# Patient Record
Sex: MALE | Race: WHITE | NOT HISPANIC OR LATINO | Employment: FULL TIME | URBAN - METROPOLITAN AREA
[De-identification: names, ages, dates, MRNs, and addresses within clinical notes are randomized per-mention and may not be internally consistent; named-entity substitution may affect disease eponyms.]

---

## 2022-02-18 ENCOUNTER — OFFICE VISIT (OUTPATIENT)
Dept: FAMILY MEDICINE CLINIC | Facility: CLINIC | Age: 22
End: 2022-02-18

## 2022-02-18 VITALS
RESPIRATION RATE: 16 BRPM | TEMPERATURE: 98 F | BODY MASS INDEX: 27.62 KG/M2 | HEART RATE: 67 BPM | SYSTOLIC BLOOD PRESSURE: 126 MMHG | DIASTOLIC BLOOD PRESSURE: 67 MMHG | OXYGEN SATURATION: 98 % | WEIGHT: 176 LBS | HEIGHT: 67 IN

## 2022-02-18 DIAGNOSIS — R53.83 OTHER FATIGUE: Primary | ICD-10-CM

## 2022-02-18 DIAGNOSIS — R63.5 WEIGHT GAIN: ICD-10-CM

## 2022-02-18 DIAGNOSIS — Z86.39 HISTORY OF HYPOTHYROIDISM: ICD-10-CM

## 2022-02-18 DIAGNOSIS — K59.00 CONSTIPATION, UNSPECIFIED CONSTIPATION TYPE: ICD-10-CM

## 2022-02-18 PROCEDURE — 99204 OFFICE O/P NEW MOD 45 MIN: CPT

## 2022-02-18 NOTE — PROGRESS NOTES
"Chief Complaint  Thyroid Problem    Subjective          Rafael Mcdowell presents to [facility]    History of Present Illness    Rafael Mcdowell 21 y.o. male who presents today for a new patient appointment.  He has a history of hypothyroidism. He is not currently taking thyroid medication.  He is here to establish care, is here for a wellness check up and is a new patient to me I reviewed the PFSH recorded today by my MA/LPN staff. He has been feeling tired.    He has gained 8 pounds over the last 2-3 months due to increased appetite. He is also experiencing fatigue, hair loss, decreased concentration, constipation, and forgetfulness. He was treated for hypothyroidism in 2019 for two weeks. He was told that his thyroid levels were back to normal after two weeks and to stop taking Levothyroxine. He has a family history of hypothyroidism with his Mother.     He  denies medication side effects.        Objective     Vital Signs:   /67   Pulse 67   Temp 98 °F (36.7 °C)   Resp 16   Ht 170.2 cm (67\")   Wt 79.8 kg (176 lb)   SpO2 98%   BMI 27.57 kg/m²      Review of Systems   Constitutional: Positive for appetite change (increased appetite), fatigue and unexpected weight gain (due to increased appetite). Negative for activity change.   Respiratory: Negative for chest tightness and shortness of breath.    Cardiovascular: Negative for chest pain.   Gastrointestinal: Positive for constipation. Negative for anal bleeding, blood in stool, nausea and vomiting.   Neurological: Negative for dizziness, light-headedness and confusion.   Psychiatric/Behavioral: Positive for decreased concentration. Negative for agitation, behavioral problems, self-injury, sleep disturbance, suicidal ideas, negative for hyperactivity and depressed mood. The patient is not nervous/anxious.          Physical Exam  Vitals and nursing note reviewed.   Constitutional:       General: He is not in acute distress.    "  Appearance: Normal appearance. He is well-developed. He is not ill-appearing.   HENT:      Head: Normocephalic and atraumatic.   Eyes:      General: No scleral icterus.        Right eye: No discharge.         Left eye: No discharge.      Conjunctiva/sclera: Conjunctivae normal.      Pupils: Pupils are equal, round, and reactive to light.   Neck:      Thyroid: No thyromegaly.      Vascular: No carotid bruit.      Trachea: No tracheal deviation.   Cardiovascular:      Rate and Rhythm: Normal rate and regular rhythm.      Pulses: Normal pulses.      Heart sounds: Normal heart sounds. No murmur heard.  No gallop.    Pulmonary:      Effort: Pulmonary effort is normal. No tachypnea, bradypnea or respiratory distress.      Breath sounds: No decreased air movement. No decreased breath sounds, wheezing or rales.   Musculoskeletal:         General: Normal range of motion.      Cervical back: Normal range of motion and neck supple.      Right lower leg: No edema.      Left lower leg: No edema.   Skin:     General: Skin is warm.      Coloration: Skin is not pale.      Findings: No erythema or rash.   Neurological:      Mental Status: He is alert and oriented to person, place, and time.      Motor: No weakness or abnormal muscle tone.      Coordination: Coordination normal.      Gait: Gait normal.   Psychiatric:         Mood and Affect: Mood normal.         Behavior: Behavior normal.         Thought Content: Thought content normal.         Judgment: Judgment normal.                     Assessment and Plan      Diagnoses and all orders for this visit:    1. Other fatigue (Primary)  Comments:  Labs today    Orders:  -     Comprehensive metabolic panel  -     Lipid panel  -     CBC and Differential  -     TSH  -     T4, Free  -     T3, Free  -     Testosterone, Free, Total  -     Vitamin D 25 Hydroxy  -     Vitamin B12    2. Weight gain  Comments:  Labs today  Orders:  -     Comprehensive metabolic panel  -     Lipid panel  -      CBC and Differential  -     TSH  -     T4, Free  -     T3, Free  -     Testosterone, Free, Total  -     Vitamin D 25 Hydroxy  -     Vitamin B12    3. Constipation, unspecified constipation type  Comments:  Transient  Increase water intake   Take Miralax OTC as needed  Orders:  -     Comprehensive metabolic panel  -     Lipid panel  -     CBC and Differential  -     TSH  -     T4, Free  -     T3, Free  -     Testosterone, Free, Total  -     Vitamin D 25 Hydroxy  -     Vitamin B12    4. History of hypothyroidism  Comments:  Labs today    Orders:  -     TSH  -     T4, Free  -     T3, Free            Follow Up     Return in about 6 months (around 8/18/2022) for Annual physical.    Patient was given instructions and counseling regarding his condition or for health maintenance advice. Please see specific information pulled into the AVS if appropriate.     -Labs today for further evaluation.   -Return in 6 months for annual physical examination.   -Eat high-fiber diet, drink more water, and take Miralax as needed for constipation.

## 2022-02-18 NOTE — PATIENT INSTRUCTIONS
Fatigue  If you have fatigue, you feel tired all the time and have a lack of energy or a lack of motivation. Fatigue may make it difficult to start or complete tasks because of exhaustion. In general, occasional or mild fatigue is often a normal response to activity or life. However, long-lasting (chronic) or extreme fatigue may be a symptom of a medical condition.  Follow these instructions at home:  General instructions  · Watch your fatigue for any changes.  · Go to bed and get up at the same time every day.  · Avoid fatigue by pacing yourself during the day and getting enough sleep at night.  · Maintain a healthy weight.  Medicines  · Take over-the-counter and prescription medicines only as told by your health care provider.  · Take a multivitamin, if told by your health care provider.   · Do not use herbal or dietary supplements unless they are approved by your health care provider.  Activity    · Exercise regularly, as told by your health care provider.  · Use or practice techniques to help you relax, such as yoga, mauro chi, meditation, or massage therapy.    Eating and drinking    · Avoid heavy meals in the evening.  · Eat a well-balanced diet, which includes lean proteins, whole grains, plenty of fruits and vegetables, and low-fat dairy products.  · Avoid consuming too much caffeine.  · Avoid the use of alcohol.  · Drink enough fluid to keep your urine pale yellow.    Lifestyle  · Change situations that cause you stress. Try to keep your work and personal schedule in balance.  · Do not use any products that contain nicotine or tobacco, such as cigarettes and e-cigarettes. If you need help quitting, ask your health care provider.  · Do not use drugs.  Contact a health care provider if:  · Your fatigue does not get better.  · You have a fever.  · You suddenly lose or gain weight.  · You have headaches.  · You have trouble falling asleep or sleeping through the night.  · You feel angry, guilty, anxious, or  sad.  · You are unable to have a bowel movement (constipation).  · Your skin is dry.  · You have swelling in your legs or another part of your body.  Get help right away if:  · You feel confused.  · Your vision is blurry.  · You feel faint or you pass out.  · You have a severe headache.  · You have severe pain in your abdomen, your back, or the area between your waist and hips (pelvis).  · You have chest pain, shortness of breath, or an irregular or fast heartbeat.  · You are unable to urinate, or you urinate less than normal.  · You have abnormal bleeding, such as bleeding from the rectum, vagina, nose, lungs, or nipples.  · You vomit blood.  · You have thoughts about hurting yourself or others.  If you ever feel like you may hurt yourself or others, or have thoughts about taking your own life, get help right away. You can go to your nearest emergency department or call:  · Your local emergency services (911 in the [place]  · A suicide crisis helpline, such as the National Suicide Prevention Lifeline at 1-998.113.9045. This is open 24 hours a day.  Summary  · If you have fatigue, you feel tired all the time and have a lack of energy or a lack of motivation.  · Fatigue may make it difficult to start or complete tasks because of exhaustion.  · Long-lasting (chronic) or extreme fatigue may be a symptom of a medical condition.  · Exercise regularly, as told by your health care provider.  · Change situations that cause you stress. Try to keep your work and personal schedule in balance.  This information is not intended to replace advice given to you by your health care provider. Make sure you discuss any questions you have with your health care provider.  Document Revised: 07/08/2020 Document Reviewed: 09/12/2018  [employer]vier Patient Education © 2021 [employer]

## 2022-03-28 DIAGNOSIS — R79.89 ELEVATED SERUM CREATININE: Primary | ICD-10-CM

## 2022-03-28 DIAGNOSIS — R79.89 LOW SERUM TESTOSTERONE LEVEL IN MALE: ICD-10-CM

## 2022-03-28 DIAGNOSIS — R79.89 ELEVATED SERUM FREE T4 LEVEL: ICD-10-CM

## 2022-03-28 DIAGNOSIS — R74.8 LOW SERUM ALKALINE PHOSPHATASE: ICD-10-CM

## 2022-03-28 LAB
25(OH)D3+25(OH)D2 SERPL-MCNC: 40.9 NG/ML (ref 30–100)
ALBUMIN SERPL-MCNC: 4.9 G/DL (ref 4.1–5.2)
ALBUMIN/GLOB SERPL: 2.3 {RATIO} (ref 1.2–2.2)
ALP SERPL-CCNC: 40 IU/L (ref 44–121)
ALT SERPL-CCNC: 24 IU/L (ref 0–44)
AST SERPL-CCNC: 16 IU/L (ref 0–40)
BASOPHILS # BLD AUTO: 0 X10E3/UL (ref 0–0.2)
BASOPHILS NFR BLD AUTO: 1 %
BILIRUB SERPL-MCNC: 0.4 MG/DL (ref 0–1.2)
BUN SERPL-MCNC: 10 MG/DL (ref 6–20)
BUN/CREAT SERPL: 7 (ref 9–20)
CALCIUM SERPL-MCNC: 9.8 MG/DL (ref 8.7–10.2)
CHLORIDE SERPL-SCNC: 102 MMOL/L (ref 96–106)
CHOLEST SERPL-MCNC: 148 MG/DL (ref 100–199)
CO2 SERPL-SCNC: 25 MMOL/L (ref 20–29)
CREAT SERPL-MCNC: 1.34 MG/DL (ref 0.76–1.27)
EGFRCR SERPLBLD CKD-EPI 2021: 76 ML/MIN/1.73
EOSINOPHIL # BLD AUTO: 0.1 X10E3/UL (ref 0–0.4)
EOSINOPHIL NFR BLD AUTO: 2 %
ERYTHROCYTE [DISTWIDTH] IN BLOOD BY AUTOMATED COUNT: 12.6 % (ref 11.6–15.4)
GLOBULIN SER CALC-MCNC: 2.1 G/DL (ref 1.5–4.5)
GLUCOSE SERPL-MCNC: 91 MG/DL (ref 65–99)
HCT VFR BLD AUTO: 42.6 % (ref 37.5–51)
HDLC SERPL-MCNC: 55 MG/DL
HGB BLD-MCNC: 15 G/DL (ref 13–17.7)
IMM GRANULOCYTES # BLD AUTO: 0 X10E3/UL (ref 0–0.1)
IMM GRANULOCYTES NFR BLD AUTO: 0 %
LDLC SERPL CALC-MCNC: 78 MG/DL (ref 0–99)
LYMPHOCYTES # BLD AUTO: 2.5 X10E3/UL (ref 0.7–3.1)
LYMPHOCYTES NFR BLD AUTO: 41 %
MCH RBC QN AUTO: 31.1 PG (ref 26.6–33)
MCHC RBC AUTO-ENTMCNC: 35.2 G/DL (ref 31.5–35.7)
MCV RBC AUTO: 88 FL (ref 79–97)
MONOCYTES # BLD AUTO: 0.3 X10E3/UL (ref 0.1–0.9)
MONOCYTES NFR BLD AUTO: 6 %
NEUTROPHILS # BLD AUTO: 3 X10E3/UL (ref 1.4–7)
NEUTROPHILS NFR BLD AUTO: 50 %
PLATELET # BLD AUTO: 227 X10E3/UL (ref 150–450)
POTASSIUM SERPL-SCNC: 4.3 MMOL/L (ref 3.5–5.2)
PROT SERPL-MCNC: 7 G/DL (ref 6–8.5)
RBC # BLD AUTO: 4.83 X10E6/UL (ref 4.14–5.8)
SODIUM SERPL-SCNC: 143 MMOL/L (ref 134–144)
T3FREE SERPL-MCNC: 3.3 PG/ML (ref 2–4.4)
T4 FREE SERPL-MCNC: 1.85 NG/DL (ref 0.82–1.77)
TESTOST FREE SERPL-MCNC: 7.4 PG/ML (ref 9.3–26.5)
TESTOST SERPL-MCNC: 230 NG/DL (ref 264–916)
TRIGL SERPL-MCNC: 80 MG/DL (ref 0–149)
TSH SERPL DL<=0.005 MIU/L-ACNC: 2.7 UIU/ML (ref 0.45–4.5)
VIT B12 SERPL-MCNC: 1273 PG/ML (ref 232–1245)
VLDLC SERPL CALC-MCNC: 15 MG/DL (ref 5–40)
WBC # BLD AUTO: 6 X10E3/UL (ref 3.4–10.8)

## 2022-12-27 ENCOUNTER — OFFICE VISIT (OUTPATIENT)
Dept: FAMILY MEDICINE CLINIC | Facility: CLINIC | Age: 22
End: 2022-12-27

## 2022-12-27 VITALS
HEART RATE: 78 BPM | BODY MASS INDEX: 26.53 KG/M2 | OXYGEN SATURATION: 98 % | SYSTOLIC BLOOD PRESSURE: 106 MMHG | TEMPERATURE: 98.2 F | DIASTOLIC BLOOD PRESSURE: 66 MMHG | RESPIRATION RATE: 16 BRPM | WEIGHT: 169 LBS | HEIGHT: 67 IN

## 2022-12-27 DIAGNOSIS — R79.89 LOW TESTOSTERONE LEVEL IN MALE: ICD-10-CM

## 2022-12-27 DIAGNOSIS — Z00.00 ANNUAL PHYSICAL EXAM: Primary | ICD-10-CM

## 2022-12-27 PROCEDURE — 99395 PREV VISIT EST AGE 18-39: CPT

## 2022-12-27 NOTE — PROGRESS NOTES
"Chief Complaint  Annual Exam    Subjective          History of Present Illness    Rafael Mcdowell 22 y.o. male presents for an Annual Wellness Visit.  He has been feeling well.  I reviewed health maintenance with him as part of my preventative care plan.    Vision exam: Up to date  Dental exam: Not up to date, will make an appointment  Exercise: 1 day per week  Alcohol: 2 drinks per week  Tobacco: vapes occasionally     He has a history of low testosterone.  He was referred to Urology at his last appointment.  He had an appointment with Urology this year.    Review of Systems   Constitutional: Negative for appetite change, chills, fatigue and fever.   HENT: Negative for congestion, sinus pressure and trouble swallowing.    Eyes: Negative for visual disturbance.   Respiratory: Negative for cough, chest tightness, shortness of breath and wheezing.    Cardiovascular: Negative for chest pain, palpitations and leg swelling.   Gastrointestinal: Negative for abdominal pain, constipation, diarrhea, nausea and vomiting.   Genitourinary: Negative for dysuria, frequency and urgency.   Musculoskeletal: Negative for gait problem, myalgias and neck pain.   Skin: Negative for rash.   Neurological: Negative for dizziness, syncope, weakness and light-headedness.   Psychiatric/Behavioral: Negative for dysphoric mood. The patient is not nervous/anxious.         Objective   Vital Signs:   /66   Pulse 78   Temp 98.2 °F (36.8 °C) (Oral)   Resp 16   Ht 170.2 cm (67\")   Wt 76.7 kg (169 lb)   SpO2 98%   BMI 26.47 kg/m²      BMI is >= 25 and <30. (Overweight) The following options were offered after discussion;: exercise counseling/recommendations and nutrition counseling/recommendations        Physical Exam  Vitals and nursing note reviewed.   Constitutional:       General: He is awake. He is not in acute distress.     Appearance: Normal appearance. He is well-developed, well-groomed and overweight. He is not ill-appearing, " toxic-appearing or diaphoretic.   HENT:      Head: Normocephalic and atraumatic.      Right Ear: Tympanic membrane, ear canal and external ear normal. No decreased hearing noted. No drainage, swelling or tenderness. There is no impacted cerumen. Tympanic membrane is not erythematous, retracted or bulging.      Left Ear: Tympanic membrane, ear canal and external ear normal. No decreased hearing noted. No drainage, swelling or tenderness. There is no impacted cerumen. Tympanic membrane is not erythematous, retracted or bulging.      Nose: Nose normal. No nasal tenderness, mucosal edema, congestion or rhinorrhea.      Right Nostril: No epistaxis.      Left Nostril: No epistaxis.      Right Turbinates: Not enlarged or swollen.      Left Turbinates: Not enlarged or swollen.      Right Sinus: No maxillary sinus tenderness or frontal sinus tenderness.      Left Sinus: No maxillary sinus tenderness or frontal sinus tenderness.      Mouth/Throat:      Lips: Pink. No lesions.      Mouth: Mucous membranes are moist.      Dentition: Normal dentition.      Tongue: No lesions. Tongue does not deviate from midline.      Pharynx: Oropharynx is clear. No pharyngeal swelling, oropharyngeal exudate, posterior oropharyngeal erythema or uvula swelling.      Tonsils: No tonsillar exudate or tonsillar abscesses.   Eyes:      General: Lids are normal. Vision grossly intact. No scleral icterus.        Right eye: No discharge or hordeolum.         Left eye: No discharge or hordeolum.      Extraocular Movements: Extraocular movements intact.      Right eye: Normal extraocular motion and no nystagmus.      Left eye: Normal extraocular motion and no nystagmus.      Conjunctiva/sclera: Conjunctivae normal.      Right eye: Right conjunctiva is not injected. No exudate or hemorrhage.     Left eye: Left conjunctiva is not injected. No exudate or hemorrhage.     Pupils: Pupils are equal, round, and reactive to light.   Neck:      Thyroid: No thyroid  mass or thyromegaly.      Vascular: No carotid bruit.      Trachea: Trachea normal. No tracheal deviation.   Cardiovascular:      Rate and Rhythm: Normal rate and regular rhythm.      Pulses: Normal pulses.           Radial pulses are 2+ on the right side and 2+ on the left side.        Popliteal pulses are 2+ on the right side and 2+ on the left side.        Dorsalis pedis pulses are 2+ on the right side and 2+ on the left side.      Heart sounds: Normal heart sounds, S1 normal and S2 normal. No murmur heard.    No gallop.   Pulmonary:      Effort: Pulmonary effort is normal. No respiratory distress.      Breath sounds: Normal breath sounds. No decreased breath sounds, wheezing, rhonchi or rales.   Chest:      Chest wall: No tenderness.   Breasts:     Right: Normal. No swelling.      Left: Normal. No swelling.   Abdominal:      General: Bowel sounds are normal. There is no distension.      Palpations: Abdomen is soft. There is no mass.      Tenderness: There is no abdominal tenderness. There is no guarding or rebound.      Hernia: No hernia is present.   Musculoskeletal:         General: No swelling or tenderness. Normal range of motion.      Right shoulder: Normal.      Left shoulder: Normal.      Right upper arm: Normal.      Left upper arm: Normal.      Right elbow: Normal.      Left elbow: Normal.      Right forearm: Normal.      Left forearm: Normal.      Right wrist: Normal.      Left wrist: Normal.      Right hand: Normal.      Left hand: Normal.      Cervical back: Full passive range of motion without pain, normal range of motion and neck supple. No edema, erythema, rigidity or tenderness. Normal range of motion.      Right lower leg: No edema.      Left lower leg: No edema.   Lymphadenopathy:      Head:      Right side of head: No submental, submandibular, tonsillar, preauricular, posterior auricular or occipital adenopathy.      Left side of head: No submental, submandibular, tonsillar, preauricular,  posterior auricular or occipital adenopathy.      Cervical: No cervical adenopathy.      Right cervical: No superficial, deep or posterior cervical adenopathy.     Left cervical: No superficial, deep or posterior cervical adenopathy.      Upper Body:      Right upper body: No supraclavicular adenopathy.      Left upper body: No supraclavicular adenopathy.   Skin:     General: Skin is warm and dry.      Capillary Refill: Capillary refill takes less than 2 seconds.      Coloration: Skin is not pale.      Findings: No erythema or rash.   Neurological:      General: No focal deficit present.      Mental Status: He is alert and oriented to person, place, and time.      Cranial Nerves: No cranial nerve deficit.      Sensory: Sensation is intact. No sensory deficit.      Motor: Motor function is intact. No weakness or abnormal muscle tone.      Coordination: Coordination is intact. Romberg sign negative. Coordination normal. Finger-Nose-Finger Test normal.      Gait: Gait is intact. Gait and tandem walk normal.      Deep Tendon Reflexes: Reflexes normal.      Reflex Scores:       Tricep reflexes are 2+ on the right side and 2+ on the left side.       Bicep reflexes are 2+ on the right side and 2+ on the left side.       Brachioradialis reflexes are 2+ on the right side and 2+ on the left side.       Patellar reflexes are 2+ on the right side and 2+ on the left side.       Achilles reflexes are 2+ on the right side and 2+ on the left side.  Psychiatric:         Attention and Perception: Attention normal.         Mood and Affect: Mood and affect normal.         Speech: Speech normal. Speech is not delayed or slurred.         Behavior: Behavior normal. Behavior is cooperative.         Thought Content: Thought content normal.         Cognition and Memory: Cognition and memory normal. Cognition is not impaired. Memory is not impaired. He does not exhibit impaired recent memory or impaired remote memory.         Judgment:  Judgment normal.                         Assessment and Plan      Diagnoses and all orders for this visit:    1. Annual physical exam (Primary)  Comments:  Normal exam  Health maintenance updated, labs ordered  Work on weight loss, exercise  Follow-up in 1 year for annual physical  Orders:  -     Comprehensive metabolic panel  -     Lipid panel  -     CBC and Differential  -     TSH  -     Testosterone, Free, Total    2. Low testosterone level in male  -     Testosterone, Free, Total            Follow Up     Return in about 1 year (around 12/27/2023) for Annual physical.    Patient was given instructions and counseling regarding his condition or for health maintenance advice. Please see specific information pulled into the AVS if appropriate.     Preventative counseling provided during visit.   Low glycemic index diet  Exercise 30 minutes most days of the week  Make sure you get results on any labs or tests we ordered today  We discussed medications and how to take them as prescribed  Sleep 6-8 hours each night if possible    LDL goal <100  HDL goal >60  Triglyceride goal <150  BP goal =<130/80  Fasting glucose <100    -Return in 1 year for annual physical examination.

## 2022-12-30 LAB
ALBUMIN SERPL-MCNC: 4.9 G/DL (ref 3.5–5.2)
ALBUMIN/GLOB SERPL: 3.1 G/DL
ALP SERPL-CCNC: 35 U/L (ref 39–117)
ALT SERPL-CCNC: 17 U/L (ref 1–41)
AST SERPL-CCNC: 15 U/L (ref 1–40)
BASOPHILS # BLD AUTO: 0.03 10*3/MM3 (ref 0–0.2)
BASOPHILS NFR BLD AUTO: 0.6 % (ref 0–1.5)
BILIRUB SERPL-MCNC: 0.5 MG/DL (ref 0–1.2)
BUN SERPL-MCNC: 14 MG/DL (ref 6–20)
BUN/CREAT SERPL: 11.8 (ref 7–25)
CALCIUM SERPL-MCNC: 9.5 MG/DL (ref 8.6–10.5)
CHLORIDE SERPL-SCNC: 103 MMOL/L (ref 98–107)
CHOLEST SERPL-MCNC: 163 MG/DL (ref 0–200)
CO2 SERPL-SCNC: 28.5 MMOL/L (ref 22–29)
CREAT SERPL-MCNC: 1.19 MG/DL (ref 0.76–1.27)
EGFRCR SERPLBLD CKD-EPI 2021: 87.5 ML/MIN/1.73
EOSINOPHIL # BLD AUTO: 0.13 10*3/MM3 (ref 0–0.4)
EOSINOPHIL NFR BLD AUTO: 2.6 % (ref 0.3–6.2)
ERYTHROCYTE [DISTWIDTH] IN BLOOD BY AUTOMATED COUNT: 12.3 % (ref 12.3–15.4)
GLOBULIN SER CALC-MCNC: 1.6 GM/DL
GLUCOSE SERPL-MCNC: 87 MG/DL (ref 65–99)
HCT VFR BLD AUTO: 42.2 % (ref 37.5–51)
HDLC SERPL-MCNC: 57 MG/DL (ref 40–60)
HGB BLD-MCNC: 13.4 G/DL (ref 13–17.7)
IMM GRANULOCYTES # BLD AUTO: 0.01 10*3/MM3 (ref 0–0.05)
IMM GRANULOCYTES NFR BLD AUTO: 0.2 % (ref 0–0.5)
LDLC SERPL CALC-MCNC: 93 MG/DL (ref 0–100)
LYMPHOCYTES # BLD AUTO: 1.89 10*3/MM3 (ref 0.7–3.1)
LYMPHOCYTES NFR BLD AUTO: 37.3 % (ref 19.6–45.3)
MCH RBC QN AUTO: 29.2 PG (ref 26.6–33)
MCHC RBC AUTO-ENTMCNC: 31.8 G/DL (ref 31.5–35.7)
MCV RBC AUTO: 91.9 FL (ref 79–97)
MONOCYTES # BLD AUTO: 0.39 10*3/MM3 (ref 0.1–0.9)
MONOCYTES NFR BLD AUTO: 7.7 % (ref 5–12)
NEUTROPHILS # BLD AUTO: 2.62 10*3/MM3 (ref 1.7–7)
NEUTROPHILS NFR BLD AUTO: 51.6 % (ref 42.7–76)
NRBC BLD AUTO-RTO: 0 /100 WBC (ref 0–0.2)
PLATELET # BLD AUTO: 182 10*3/MM3 (ref 140–450)
POTASSIUM SERPL-SCNC: 4.8 MMOL/L (ref 3.5–5.2)
PROT SERPL-MCNC: 6.5 G/DL (ref 6–8.5)
RBC # BLD AUTO: 4.59 10*6/MM3 (ref 4.14–5.8)
SODIUM SERPL-SCNC: 141 MMOL/L (ref 136–145)
TESTOST FREE SERPL-MCNC: 12.4 PG/ML (ref 9.3–26.5)
TESTOST SERPL-MCNC: 316 NG/DL (ref 264–916)
TRIGL SERPL-MCNC: 68 MG/DL (ref 0–150)
TSH SERPL DL<=0.005 MIU/L-ACNC: 2.93 UIU/ML (ref 0.27–4.2)
VLDLC SERPL CALC-MCNC: 13 MG/DL (ref 5–40)
WBC # BLD AUTO: 5.07 10*3/MM3 (ref 3.4–10.8)